# Patient Record
Sex: FEMALE | Race: WHITE | URBAN - METROPOLITAN AREA
[De-identification: names, ages, dates, MRNs, and addresses within clinical notes are randomized per-mention and may not be internally consistent; named-entity substitution may affect disease eponyms.]

---

## 2017-01-13 ENCOUNTER — OFFICE VISIT (OUTPATIENT)
Dept: URGENT CARE | Facility: URGENT CARE | Age: 22
End: 2017-01-13
Payer: OTHER MISCELLANEOUS

## 2017-01-13 ENCOUNTER — TRANSFERRED RECORDS (OUTPATIENT)
Dept: HEALTH INFORMATION MANAGEMENT | Facility: CLINIC | Age: 22
End: 2017-01-13

## 2017-01-13 ENCOUNTER — RADIANT APPOINTMENT (OUTPATIENT)
Dept: GENERAL RADIOLOGY | Facility: CLINIC | Age: 22
End: 2017-01-13
Attending: FAMILY MEDICINE
Payer: OTHER MISCELLANEOUS

## 2017-01-13 VITALS
SYSTOLIC BLOOD PRESSURE: 102 MMHG | BODY MASS INDEX: 19.44 KG/M2 | HEART RATE: 76 BPM | DIASTOLIC BLOOD PRESSURE: 68 MMHG | OXYGEN SATURATION: 98 % | WEIGHT: 115 LBS

## 2017-01-13 DIAGNOSIS — S50.02XA CONTUSION OF LEFT ELBOW, INITIAL ENCOUNTER: Primary | ICD-10-CM

## 2017-01-13 DIAGNOSIS — M25.522 ELBOW PAIN, LEFT: ICD-10-CM

## 2017-01-13 PROCEDURE — 99213 OFFICE O/P EST LOW 20 MIN: CPT | Performed by: FAMILY MEDICINE

## 2017-01-13 PROCEDURE — 73080 X-RAY EXAM OF ELBOW: CPT | Mod: LT

## 2017-01-13 NOTE — NURSING NOTE
"Chief Complaint   Patient presents with     Urgent Care     Elbow Pain     Hit elbow on ladder at work on Tuesday. Left elbow.        Initial /68 mmHg  Pulse 76  Wt 115 lb (52.164 kg)  SpO2 98% Estimated body mass index is 19.44 kg/(m^2) as calculated from the following:    Height as of 7/6/12: 5' 4.5\" (1.638 m).    Weight as of this encounter: 115 lb (52.164 kg).  BP completed using cuff size: GANESH Roque    "

## 2017-01-13 NOTE — Clinical Note
[facility]  [address]  [address]  [place] [address]  [phone]        2017    REPORT OF WORK ABILITY    PATIENT DATA  Employee Name: Lydia Proctor        : 1995   [id number]  Work related injury: YES  Today's date: 2017  Date of injury: 1/10/2017    PROVIDER EVALUATION: Please fill in as needed.  Please give copy to employee for employer.  1. Diagnosis: Contusion of the left elbow, Left Elbow Pain  2. Treatment: Ice, Arm Sling, Ibuprofen, Tylenol  3. Medication: no prescriptions.   NOTE: When ordering a medication, [place] Rules [place] or WC on prescriptions.  4. Return to work date: 2017 with the following restrictions:  No use of the left arm until 2017.  Wear the left arm sling during this period.      RESTRICTIONS:  Restrictions apply to home and leisure also.  If work within restrictions is not available, the employee is totally disabled.  Provider comments: follow up in 2 weeks if not better.   Medical Examiner: Tomas Willams MD      License or registration: [id number]    Next appointment: As needed in 2 weeks.     CC: Employer, Managed Care Plan/Payor, Patient

## 2017-01-13 NOTE — MR AVS SNAPSHOT
"              After Visit Summary   1/13/2017    Lydia Proctor    MRN: [id number]           Patient Information     Date Of Birth          1995        Visit Information        Provider Department      1/13/2017 2:00 PM Tomas Willams MD [facility]        Today's Diagnoses     Contusion of left elbow, initial encounter    -  1     Elbow pain, left           Care Instructions    Continue wearing the arm sling for two weeks until the pain decreases to a manageable level.        Tylenol, ibuprofen for the pain    Place ice onto the painful area of the left elbow for about 15 minutes at a time, every 2-3 hours while awake at home.    follow up if not better in 2 weeks.         Follow-ups after your visit        Who to contact     If you have questions or need follow up information about today's clinic visit or your schedule please contact [facility] directly at [phone].  Normal or non-critical lab and imaging results will be communicated to you by MyChart, letter or phone within 4 business days after the clinic has received the results. If you do not hear from us within 7 days, please contact the clinic through [employer] or phone. If you have a critical or abnormal lab result, we will notify you by phone as soon as possible.  Submit refill requests through [employer] or call your pharmacy and they will forward the refill request to us. Please allow 3 business days for your refill to be completed.          Additional Information About Your Visit        MyChart Information     [employer] lets you send messages to your doctor, view your test results, renew your prescriptions, schedule appointments and more. To sign up, go to [email or web address] . Click on \"Log in\" on the left side of the screen, which will take you to the Welcome page. Then click on \"Sign up Now\" on the right side of the page.     You will be asked to enter the access code listed below, as well as some personal " information. Please follow the directions to create your username and password.     Your access code is: [email or web address]  Expires: 2017  3:33 PM     Your access code will  in 90 days. If you need help or a new code, please call your [facility] clinic or [phone].        Care EveryWhere ID     This is your Care EveryWhere ID. This could be used by other organizations to access your [facility] medical records  [id number]        Your Vitals Were     Pulse Pulse Oximetry                76 98%           Blood Pressure from Last 3 Encounters:   17 102/68   12 95/58   12 114/55    Weight from Last 3 Encounters:   17 115 lb (52.164 kg)   12 104 lb (47.174 kg) (17.19 %*)   12 103 lb 9.6 oz (46.993 kg) (17.59 %*)     * Growth percentiles are based on [facility] 2-20 Years data.              Today, you had the following     No orders found for display       Primary Care Provider    None Specified       No primary provider on file.        Thank you!     Thank you for choosing [facility]  for your care. Our goal is always to provide you with excellent care. Hearing back from our patients is one way we can continue to improve our services. Please take a few minutes to complete the written survey that you may receive in the mail after your visit with us. Thank you!             Your Updated Medication List - Protect others around you: Learn how to safely use, store and throw away your medicines at www.disposemymeds.org.          This list is accurate as of: 17  3:33 PM.  Always use your most recent med list.                   Brand Name Dispense Instructions for use    NO ACTIVE MEDICATIONS

## 2017-01-13 NOTE — PROGRESS NOTES
SUBJECTIVE:  Chief Complaint   Patient presents with     Urgent Care     Elbow Pain     Hit elbow on ladder at work on Tuesday. Left elbow.      Lydia Proctor is a 21 year old right-handed female presents with a chief complaint of a painful, stabbing, throbbing sensation at the tip of the left elbow.  Pain severity ratin out of 10 at rest, 8/10 with ROM (extension and flexion).    The injury occurred on January 10, 2017.  .   The injury happened while at work at [employer].  . 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   Pain exacerbated by movement at the left elbow. .  She treated it initially with Ice, Sling, Tylenol.  . This is the first time this type of injury has occurred to this patient.     Past medical history:  No major medical problems.     Current Outpatient Prescriptions   Medication Sig Dispense Refill     NO ACTIVE MEDICATIONS        Social History   Substance Use Topics     Smoking status: Never Smoker      Smokeless tobacco: Never Used     Alcohol Use: No      Comment: hf 13        ROS:  Review of systems negative except as stated above.    EXAM:   /68 mmHg  Pulse 76  Wt 115 lb (52.164 kg)  SpO2 98%  Gen: healthy,alert,no distress  Extremity: left elbow has no ecchymosis/edema.  There is pain with palpation over the tendon of the triceps near the olecranon.  Normal ROM at the elbow; however, there is flexion, extension.  .   SKIN: no suspicious lesions or rashes    X-RAY was done.    X-rays of the left elbow within normal limits     ASSESSMENT:   Left Elbow Pain  Left Elbow contusion    PLAN:  1) Rest the left elbow in the arm sling for the next two weeks. (Patent has an arm sling from work.)  2) Tylenol, ibuprofen for the pain  3) follow up if not better in 2 weeks.   4) No use of the left arm until 2017.    Tomas Willams MD

## 2017-01-13 NOTE — PATIENT INSTRUCTIONS
Continue wearing the arm sling for two weeks until the pain decreases to a manageable level.        Tylenol, ibuprofen for the pain    Place ice onto the painful area of the left elbow for about 15 minutes at a time, every 2-3 hours while awake at home.    follow up if not better in 2 weeks.